# Patient Record
Sex: FEMALE | Employment: FULL TIME | URBAN - METROPOLITAN AREA
[De-identification: names, ages, dates, MRNs, and addresses within clinical notes are randomized per-mention and may not be internally consistent; named-entity substitution may affect disease eponyms.]

---

## 2024-02-23 ENCOUNTER — APPOINTMENT (OUTPATIENT)
Dept: LAB | Facility: CLINIC | Age: 34
End: 2024-02-23

## 2024-02-23 ENCOUNTER — OCCMED (OUTPATIENT)
Dept: URGENT CARE | Facility: CLINIC | Age: 34
End: 2024-02-23

## 2024-02-23 DIAGNOSIS — Z02.1 PRE-EMPLOYMENT EXAMINATION: Primary | ICD-10-CM

## 2024-02-23 DIAGNOSIS — Z02.1 PRE-EMPLOYMENT EXAMINATION: ICD-10-CM

## 2024-02-23 LAB
MEV IGG SER QL IA: ABNORMAL
MUV IGG SER QL IA: NORMAL
RUBV IGG SERPL IA-ACNC: 136.6 IU/ML
VZV IGG SER QL IA: ABNORMAL

## 2024-02-23 PROCEDURE — 86787 VARICELLA-ZOSTER ANTIBODY: CPT

## 2024-02-23 PROCEDURE — 86735 MUMPS ANTIBODY: CPT

## 2024-02-23 PROCEDURE — 86765 RUBEOLA ANTIBODY: CPT

## 2024-02-23 PROCEDURE — 86480 TB TEST CELL IMMUN MEASURE: CPT

## 2024-02-23 PROCEDURE — 86762 RUBELLA ANTIBODY: CPT

## 2024-02-23 PROCEDURE — 36415 COLL VENOUS BLD VENIPUNCTURE: CPT

## 2024-02-24 LAB
GAMMA INTERFERON BACKGROUND BLD IA-ACNC: 0.16 IU/ML
M TB IFN-G BLD-IMP: NEGATIVE
M TB IFN-G CD4+ BCKGRND COR BLD-ACNC: -0.07 IU/ML
M TB IFN-G CD4+ BCKGRND COR BLD-ACNC: -0.09 IU/ML
MITOGEN IGNF BCKGRD COR BLD-ACNC: 9.84 IU/ML

## 2024-04-17 ENCOUNTER — COSMETIC (OUTPATIENT)
Dept: PLASTIC SURGERY | Facility: CLINIC | Age: 34
End: 2024-04-17

## 2024-04-17 DIAGNOSIS — Z41.1 ENCOUNTER FOR COSMETIC SURGERY: Primary | ICD-10-CM

## 2024-04-17 PROCEDURE — BOTOX2 TWO AREAS OR 50 UNITS: Performed by: STUDENT IN AN ORGANIZED HEALTH CARE EDUCATION/TRAINING PROGRAM

## 2024-04-17 PROCEDURE — BOTOX1U PR BOTOX BY THE UNIT: Performed by: STUDENT IN AN ORGANIZED HEALTH CARE EDUCATION/TRAINING PROGRAM

## 2024-04-17 NOTE — PROGRESS NOTES
Botox Consult     First time?: no, had with other providers  Allergies: NKDA  Blood thinners: none   Pregnant: no  Neuromuscular conditions: no    Patient has never had botox, interested in maintenance. Particular areas of concern:  11s and forehead     Will proceed with 20 units of botox     Risks and benefits discussed, patient agreed to proceed.     14 units to glabellum  6 units to forehead     Total 20 units, 10% off employee     Patient tolerated well, f/u in 3 months        Gume Gallagher MD   [facility]astic and Re[facility]   [address] [place] [address], [address]   [place] [address]   Office: [phone]

## 2024-04-30 ENCOUNTER — TELEPHONE (OUTPATIENT)
Age: 34
End: 2024-04-30

## 2024-11-12 NOTE — TELEPHONE ENCOUNTER
Pt called said she is being billed for her last appt, something was entered in wrong and it should be emailed to [email or web address]    Please reach out to the pt when billing is updated   
Pt was called and informed that her account has been fixed.  The 10% discount was not entered in her registration.  Pt understood.  
DISCHARGE

## 2024-11-27 ENCOUNTER — TELEMEDICINE (OUTPATIENT)
Dept: OTHER | Facility: HOSPITAL | Age: 34
End: 2024-11-27
Payer: COMMERCIAL

## 2024-11-27 DIAGNOSIS — B00.1 COLD SORE: Primary | ICD-10-CM

## 2024-11-27 PROCEDURE — 99213 OFFICE O/P EST LOW 20 MIN: CPT | Performed by: PHYSICIAN ASSISTANT

## 2024-11-27 RX ORDER — VALACYCLOVIR HYDROCHLORIDE 1 G/1
TABLET, FILM COATED ORAL
Qty: 4 TABLET | Refills: 1 | Status: SHIPPED | OUTPATIENT
Start: 2024-11-27 | End: 2024-11-27

## 2024-11-27 RX ORDER — VALACYCLOVIR HYDROCHLORIDE 500 MG/1
500 TABLET, FILM COATED ORAL DAILY
Qty: 5 TABLET | Refills: 1 | Status: SHIPPED | OUTPATIENT
Start: 2024-11-27 | End: 2024-12-02

## 2024-11-27 NOTE — PROGRESS NOTES
Virtual Regular Visit  Name: Rosa Maria Negron      : 1990      MRN: [id number]  Encounter Provider: Danielle Lee Seiple, PA-C  Encounter Date: 2024   Encounter department: VIRTUAL CARE       Verification of patient location:  Patient is located at Other in the following state in which I hold an active license [place] :  Assessment & Plan  Cold sore    Orders:    valACYclovir (VALTREX) 1,000 mg tablet; Give 2 tablets by mouth twice a day for 1 day    valACYclovir (VALTREX) 500 mg tablet; Take 1 tablet (500 mg total) by mouth daily for 5 days    Patient with recurrent cold sores over the past month. Start valacyclovir 2000mg PO BID x 1 day, then take 500mg PO QD x 5 more days for suppression.  Continue lyseine supplement once daily. Take on an empty stomach with a full glass of water.  Also recommend daily vitamin D and elderberry supplement.     Encounter provider Danielle Lee Seiple, PA-C    The patient was identified by name and date of birth. Rosa Maria Negron was informed that this is a telemedicine visit and that the visit is being conducted through the Epic Embedded platform. She agrees to proceed..  My office door was closed. No one else was in the room.  She acknowledged consent and understanding of privacy and security of the video platform. The patient has agreed to participate and understands they can discontinue the visit at any time.    Patient is aware this is a billable service.     History was obtained from: History obtained from: patient  History of Present Illness     Rosa Maria presents via virtual visit for evaluation of recurrent cold sores. She has been getting them since she was a child, occurring every few years. However, over the past month, has had recurrent issues. Needs valtrex.   Normal appetite, drinking. Normal urine output and bowel movements.   Denies headache, fever.       Review of Systems   Constitutional:  Negative for activity change, appetite change, fatigue and fever.   HENT:   Negative for congestion, ear pain, rhinorrhea, sinus pressure, sinus pain, sneezing, sore throat and trouble swallowing.    Eyes:  Negative for discharge and redness.   Respiratory:  Negative for cough, shortness of breath and wheezing.    Gastrointestinal:  Negative for abdominal pain, constipation, diarrhea, nausea and vomiting.   Skin:  Negative for rash.        Cold sore       Objective   There were no vitals taken for this visit.    Physical Exam  Constitutional:       General: She is awake.      Appearance: Normal appearance. She is well-developed, well-groomed and normal weight. She is not ill-appearing.   HENT:      Head: Normocephalic.      Right Ear: External ear normal.      Left Ear: External ear normal.      Nose: Nose normal. No congestion or rhinorrhea.      Mouth/Throat:      Lips: Pink. No lesions.      Mouth: Mucous membranes are moist.      Comments: Right corner of mouth with ulceration, shiny with vaseline atop  Eyes:      General: Lids are normal.   Pulmonary:      Effort: Pulmonary effort is normal. No respiratory distress.   Lymphadenopathy:      Head:      Right side of head: No tonsillar adenopathy.      Left side of head: No tonsillar adenopathy.   Skin:     Coloration: Skin is not pale.      Findings: No rash.   Neurological:      Mental Status: She is alert.   Psychiatric:         Attention and Perception: Attention normal.         Speech: Speech normal.         Behavior: Behavior is cooperative.         Visit Time  Total Visit Duration: 7 minutes not including the time spent for establishing the audio/video connection.

## 2025-01-31 ENCOUNTER — TELEMEDICINE (OUTPATIENT)
Dept: OTHER | Facility: HOSPITAL | Age: 35
End: 2025-01-31
Payer: COMMERCIAL

## 2025-01-31 DIAGNOSIS — Z76.0 ENCOUNTER FOR MEDICATION REFILL: Primary | ICD-10-CM

## 2025-01-31 PROBLEM — B00.1 RECURRENT COLD SORES: Status: ACTIVE | Noted: 2025-01-31

## 2025-01-31 PROCEDURE — 99213 OFFICE O/P EST LOW 20 MIN: CPT | Performed by: PHYSICIAN ASSISTANT

## 2025-01-31 RX ORDER — LISINOPRIL 20 MG/1
20 TABLET ORAL DAILY
COMMUNITY
End: 2025-01-31

## 2025-01-31 RX ORDER — TRETINOIN 0.25 MG/G
1 CREAM TOPICAL
COMMUNITY
End: 2025-01-31

## 2025-01-31 RX ORDER — MULTIVIT-MIN/IRON/FOLIC ACID/K 18-600-40
50 CAPSULE ORAL DAILY
COMMUNITY

## 2025-01-31 RX ORDER — TRETINOIN 0.5 MG/G
1 CREAM TOPICAL
COMMUNITY
End: 2025-02-01 | Stop reason: SDUPTHER

## 2025-01-31 RX ORDER — PROMETHAZINE HYDROCHLORIDE 25 MG/1
25 TABLET ORAL EVERY 6 HOURS PRN
COMMUNITY
Start: 2025-01-16 | End: 2025-01-31

## 2025-01-31 NOTE — PROGRESS NOTES
Virtual Regular Visit  Name: Rosa Maria Negron      : 1990      MRN: [id number]  Encounter Provider: Shannon D Severino, PA-C  Encounter Date: 2025   Encounter department: VIRTUAL CARE       Verification of patient location:  Patient is located at Other in the following state in which I [place] an active license [place] :  Assessment & Plan  Encounter for medication refill    Orders:    Ambulatory Referral to Family Practice; Future    Ambulatory Referral to Dermatology; Future  Discussed because this is not an urgent matter, defer to PCP/derm      Encounter provider Shannon D Severino, PA-C    The patient was identified by name and date of birth. Rosa Maria Negron was informed that this is a telemedicine visit and that the visit is being conducted through the Epic Embedded platform. She agrees to proceed..  My office door was closed. No one else was in the room.  She acknowledged consent and understanding of privacy and security of the video platform. The patient has agreed to participate and understands they can discontinue the visit at any time.    Patient is aware this is a billable service.     History was obtained from: History obtained from: patient and  in car  History of Present Illness     Pt requesting refill of the tretinoin. Last had it about 2-3 months ago. Recently moved from [place]. Was getting prescribed my a skin clinic. Does not have a PCP.       Review of Systems   Constitutional:  Negative for fever.   HENT:  Negative for nosebleeds.    Eyes:  Negative for redness.   Respiratory:  Negative for shortness of breath.    Cardiovascular:  Negative for chest pain.   Gastrointestinal:  Negative for blood in stool.   Genitourinary:  Negative for hematuria.   Musculoskeletal:  Negative for gait problem.   Skin:  Negative for rash.   Neurological:  Negative for seizures.   Psychiatric/Behavioral:  Negative for behavioral problems.        Objective   There were no vitals taken for this  visit.    Physical Exam  Constitutional:       General: She is not in acute distress.     Appearance: Normal appearance. She is not toxic-appearing.   HENT:      Head: Normocephalic and atraumatic.      Nose: No rhinorrhea.      Mouth/Throat:      Mouth: Mucous membranes are moist.   Eyes:      Conjunctiva/sclera: Conjunctivae normal.   Pulmonary:      Effort: Pulmonary effort is normal. No respiratory distress.      Breath sounds: No wheezing (no gross audible wheeze through computer).   Musculoskeletal:      Cervical back: Normal range of motion.   Skin:     Findings: No rash (on face or neck).   Neurological:      Mental Status: She is alert.      Cranial Nerves: No dysarthria or facial asymmetry.   Psychiatric:         Mood and Affect: Mood normal.         Behavior: Behavior normal.         Visit Time  Total Visit Duration: 7 minutes not including the time spent for establishing the audio/video connection.

## 2025-02-01 DIAGNOSIS — L70.9 ACNE, UNSPECIFIED ACNE TYPE: Primary | ICD-10-CM

## 2025-02-01 RX ORDER — TRETINOIN 0.5 MG/G
1 CREAM TOPICAL
Qty: 20 G | Refills: 0 | Status: SHIPPED | OUTPATIENT
Start: 2025-02-01 | End: 2025-02-07 | Stop reason: SDUPTHER

## 2025-02-07 ENCOUNTER — OFFICE VISIT (OUTPATIENT)
Age: 35
End: 2025-02-07
Payer: COMMERCIAL

## 2025-02-07 VITALS
HEIGHT: 60 IN | DIASTOLIC BLOOD PRESSURE: 80 MMHG | BODY MASS INDEX: 22.58 KG/M2 | OXYGEN SATURATION: 99 % | SYSTOLIC BLOOD PRESSURE: 110 MMHG | HEART RATE: 104 BPM | WEIGHT: 115 LBS | TEMPERATURE: 97.8 F

## 2025-02-07 DIAGNOSIS — R19.7 DIARRHEA, UNSPECIFIED TYPE: ICD-10-CM

## 2025-02-07 DIAGNOSIS — B00.1 RECURRENT COLD SORES: Primary | ICD-10-CM

## 2025-02-07 DIAGNOSIS — L70.9 ACNE, UNSPECIFIED ACNE TYPE: ICD-10-CM

## 2025-02-07 DIAGNOSIS — Z76.0 ENCOUNTER FOR MEDICATION REFILL: ICD-10-CM

## 2025-02-07 PROBLEM — Z83.79 FAMILY HISTORY OF CELIAC DISEASE: Status: ACTIVE | Noted: 2023-06-15

## 2025-02-07 PROCEDURE — 99204 OFFICE O/P NEW MOD 45 MIN: CPT | Performed by: PHYSICIAN ASSISTANT

## 2025-02-07 RX ORDER — VALACYCLOVIR HYDROCHLORIDE 500 MG/1
500 TABLET, FILM COATED ORAL 2 TIMES DAILY
Qty: 90 TABLET | Refills: 1 | Status: SHIPPED | OUTPATIENT
Start: 2025-02-07 | End: 2025-05-08

## 2025-02-07 RX ORDER — TRETINOIN 0.5 MG/G
1 CREAM TOPICAL
Qty: 20 G | Refills: 2 | Status: SHIPPED | OUTPATIENT
Start: 2025-02-07

## 2025-02-07 NOTE — ASSESSMENT & PLAN NOTE
-I did recommend she hold on any dairy products and greasy foods over the next 24 hours to see if her diarrhea resolves  - If not I did recommend she proceed with stool cultures.  I did recommend she  the specimen cups today at the lab  - I also ordered a CBC and CMP level for her to complete  - If there is no improvement with conservative treatment and her stool cultures are negative I would then recommend she see gastroenterology  Orders:    CBC and differential    Comprehensive metabolic panel    TSH, 3rd generation with Free T4 reflex    Ova and parasite examination; Future    Stool Enteric Bacterial Panel by PCR; Future    Clostridium difficile toxin by PCR with EIA; Future

## 2025-02-07 NOTE — PROGRESS NOTES
Name: Rosa Maria Negron      : 1990      MRN: [id number]  Encounter Provider: Viry Blackwell PA-C  Encounter Date: 2025   Encounter department: [facility] PRIMARY CARE  :  Assessment & Plan  Encounter for medication refill  -This encounter was chosen by the urgent care provider at the time of referring her and is not able to be removed at this time.  Orders:    Ambulatory Referral to Family Practice    Recurrent cold sores  -Since she is having recurrent episodes of the HSV lesions I did recommend she try daily prophylactic Valtrex 500 mg to see if this would help reduce her reoccurrences.  She would like to give this a try.  - I did recommend she follow-up with me in 3 months to include annual physical  Orders:    CBC and differential    Comprehensive metabolic panel    TSH, 3rd generation with Free T4 reflex    valACYclovir (VALTREX) 500 mg tablet; Take 1 tablet (500 mg total) by mouth 2 (two) times a day    Diarrhea, unspecified type  -I did recommend she hold on any dairy products and greasy foods over the next 24 hours to see if her diarrhea resolves  - If not I did recommend she proceed with stool cultures.  I did recommend she  the specimen cups today at the lab  - I also ordered a CBC and CMP level for her to complete  - If there is no improvement with conservative treatment and her stool cultures are negative I would then recommend she see gastroenterology  Orders:    CBC and differential    Comprehensive metabolic panel    TSH, 3rd generation with Free T4 reflex    Ova and parasite examination; Future    Stool Enteric Bacterial Panel by PCR; Future    Clostridium difficile toxin by PCR with EIA; Future    Acne, unspecified acne type  -She is taken the Retin-A for wrinkles prevention.  I did refill this medication for her since she is not able to get into [facility] dermatology until November.  She will follow-up with the specialist as scheduled and  Orders:    tretinoin  (RETIN-A) 0.05 % cream; Apply 1 Application topically daily at bedtime    M*[employer] software was used to dictate this note. It may contain errors with dictating incorrect words/spelling. Please contact provider directly for any questions.          History of Present Illness   Patient presents today to establish care.    She states she does have concerns about recurrent cold sores.  She states she has been getting them since she was a kid.  Her mom had cold sores so she feels as though she may have contracted them from her.  She states over the last 3 months though she has been getting an outbreak at least once a month.  She states that she had several visits with urgent care who placed her on Valtrex as needed for recurrent cold sores.  She would like to consider something more to help alleviate her symptoms from reoccurring.    She also states she has had loose stools for approximately 3 weeks.  She states her last episode was today.  She states she had at least 4 loose stools today.  Yesterday she did not have a bowel movement at all.  When her symptoms first started she states that she did have recurrent episodes multiple times a day for several days but it never completely resolved.  She denies any fever, chills, summoning, blood in her stools.  She states that she has noticed intermittent nausea.  She has been taking Pepto-Bismol as needed.  She denies any antibiotic use or recent travel.  She states 3 months ago her  had C. difficile because of the antibiotics that he was given for his Crohn's disease.-I did recommend she try to hold on any type of dairy products, greasy foods over the next 48 hours  -      Review of Systems   Constitutional:  Negative for chills, fever and unexpected weight change.   Gastrointestinal:  Positive for diarrhea. Negative for abdominal pain, blood in stool, constipation and vomiting.   Skin:  Positive for rash (Recurrent cold sores on her lips.).        She states that she  has been on Retin-A for several years for wrinkles prevention.  She would like a refill of the medication because she is not able to get into [facility] dermatology until November.       Objective   /80 (BP Location: Left arm, Patient Position: Sitting, Cuff Size: Standard)   Pulse 104   Temp 97.8 °F (36.6 °C)   Ht 5' (1.524 m)   Wt 52.2 kg (115 lb)   SpO2 99%   BMI 22.46 kg/m²      Physical Exam  Vitals reviewed.   Constitutional:       General: She is not in acute distress.     Appearance: Normal appearance. She is well-developed. She is not ill-appearing, toxic-appearing or diaphoretic.   HENT:      Head: Normocephalic and atraumatic.      Right Ear: Tympanic membrane normal. There is no impacted cerumen.      Left Ear: Tympanic membrane normal. There is no impacted cerumen.   Neck:      Thyroid: No thyromegaly.   Cardiovascular:      Rate and Rhythm: Normal rate and regular rhythm.      Heart sounds: Normal heart sounds. No murmur heard.  Pulmonary:      Effort: Pulmonary effort is normal. No respiratory distress.      Breath sounds: Normal breath sounds. No wheezing, rhonchi or rales.   Abdominal:      General: Bowel sounds are normal.      Palpations: Abdomen is soft. There is no mass.      Tenderness: There is no abdominal tenderness.   Musculoskeletal:         General: No deformity.      Cervical back: Neck supple.      Right lower leg: No edema.      Left lower leg: No edema.   Lymphadenopathy:      Cervical: No cervical adenopathy.   Skin:     General: Skin is warm.      Comments: Mouth: She does have several vesicular several millimeter lesions on the right side of her upper lip.   Neurological:      General: No focal deficit present.      Mental Status: She is alert.   Psychiatric:         Mood and Affect: Mood normal.         Behavior: Behavior normal.         Thought Content: Thought content normal.         Judgment: Judgment normal.

## 2025-02-07 NOTE — ASSESSMENT & PLAN NOTE
-Since she is having recurrent episodes of the HSV lesions I did recommend she try daily prophylactic Valtrex 500 mg to see if this would help reduce her reoccurrences.  She would like to give this a try.  - I did recommend she follow-up with me in 3 months to include annual physical  Orders:    CBC and differential    Comprehensive metabolic panel    TSH, 3rd generation with Free T4 reflex    valACYclovir (VALTREX) 500 mg tablet; Take 1 tablet (500 mg total) by mouth 2 (two) times a day

## 2025-02-27 ENCOUNTER — COSMETIC (OUTPATIENT)
Age: 35
End: 2025-02-27

## 2025-02-27 DIAGNOSIS — Z41.1 ENCOUNTER FOR COSMETIC PROCEDURE: Primary | ICD-10-CM

## 2025-02-27 PROCEDURE — TOXIN NEUROMODULATOR PER UNIT (BOTOX, DYSPORT, JEUVEAU, XEOMIN, DAXXIFY): Performed by: STUDENT IN AN ORGANIZED HEALTH CARE EDUCATION/TRAINING PROGRAM

## 2025-02-27 PROCEDURE — BOTOX2 TWO AREAS OR 50 UNITS: Performed by: STUDENT IN AN ORGANIZED HEALTH CARE EDUCATION/TRAINING PROGRAM

## 2025-02-27 NOTE — PROGRESS NOTES
Botox Consult     First time?: no, had with other providers  Allergies: NKDA  Blood thinners: none   Pregnant: no  Neuromuscular conditions: no     Patient has had botox with me, interested in maintenance. Particular areas of concern:  11s and forehead     Will proceed with 20 units of botox     Risks and benefits discussed, patient agreed to proceed.     14 units to glabellum  6 units to forehead     Total 20 units, 10% off employee     Patient tolerated well, f/u in 3 months        Gume Gallagher MD   [facility] Plastic and Reconstructive Surgery   [address] [place] [address], [address]   [place] [address]   Office: [phone]

## 2025-05-15 ENCOUNTER — OFFICE VISIT (OUTPATIENT)
Age: 35
End: 2025-05-15
Payer: COMMERCIAL

## 2025-05-15 VITALS
WEIGHT: 116 LBS | RESPIRATION RATE: 18 BRPM | BODY MASS INDEX: 22.78 KG/M2 | HEIGHT: 60 IN | TEMPERATURE: 97.4 F | SYSTOLIC BLOOD PRESSURE: 110 MMHG | OXYGEN SATURATION: 100 % | DIASTOLIC BLOOD PRESSURE: 78 MMHG | HEART RATE: 88 BPM

## 2025-05-15 DIAGNOSIS — Z23 ENCOUNTER FOR IMMUNIZATION: ICD-10-CM

## 2025-05-15 DIAGNOSIS — Z00.00 WELL ADULT EXAM: Primary | ICD-10-CM

## 2025-05-15 DIAGNOSIS — B00.1 RECURRENT COLD SORES: ICD-10-CM

## 2025-05-15 PROCEDURE — 99395 PREV VISIT EST AGE 18-39: CPT | Performed by: PHYSICIAN ASSISTANT

## 2025-05-15 PROCEDURE — 90471 IMMUNIZATION ADMIN: CPT

## 2025-05-15 PROCEDURE — 90651 9VHPV VACCINE 2/3 DOSE IM: CPT

## 2025-05-15 RX ORDER — VALACYCLOVIR HYDROCHLORIDE 500 MG/1
500 TABLET, FILM COATED ORAL DAILY
Qty: 90 TABLET | Refills: 1 | Status: SHIPPED | OUTPATIENT
Start: 2025-05-15 | End: 2025-11-11

## 2025-05-15 NOTE — PROGRESS NOTES
Name: Rosa Maria Negron      : 1990      MRN: [id number]  Encounter Provider: Viry Blackwell PA-C  Encounter Date: 5/15/2025   Encounter department: [facility] [address] PRIMARY CARE  :  Assessment & Plan  Well adult exam  For Pap smear  - She is going to start the HPV vaccine.  I did advise her that she needs a second 1 in 2 months and the third 1 in 6 months  -She will check with employee health about her last Tdap.  She states that she started here a year ago but she does not remember if she was given 1  - Routine physical in 1 year, follow-up in 6 months       Recurrent cold sores  -She states has been doing well on the Valtrex 500 mg once daily which she will continue  Orders:    valACYclovir (VALTREX) 500 mg tablet; Take 1 tablet (500 mg total) by mouth daily    Encounter for immunization    Orders:    HPV VACCINE 9 VALENT IM    HPV VACCINE 9 VALENT IM; Future    M*[employer] software was used to dictate this note. It may contain errors with dictating incorrect words/spelling. Please contact provider directly for any questions.          History of Present Illness   Patient presents today for annual physical.  She does not have any concerns at this time.  She states that the Valtrex 500 mg once a day has helped with any recurrent cold sores.    - She does see the dentist at least twice a year  - Denies any vision problems  -She does eat a healthy diet  - She does exercise at least 4 times a week for 1-1/2 hours  - Denies any smoking, alcohol, drug use or vaping  - She states that she is due for a Pap smear.  She will schedule with [facility] gynecology  - She is unsure if she is ever gotten the HPV vaccine but she would like to start it today  - Last Tdap unknown.  She will check with employee health at [facility] to see if it was given at the time of hire      Review of Systems    Objective   /78   Pulse 88   Temp (!) 97.4 °F (36.3 °C)   Resp 18   Ht 5' (1.524 m)   Wt 52.6 kg (116 lb)   LMP  05/02/2025 (Approximate)   SpO2 100%   BMI 22.65 kg/m²      Physical Exam  Vitals reviewed.   Constitutional:       General: She is not in acute distress.     Appearance: Normal appearance. She is well-developed. She is not ill-appearing, toxic-appearing or diaphoretic.   HENT:      Head: Normocephalic and atraumatic.   Neck:      Thyroid: No thyromegaly.     Cardiovascular:      Rate and Rhythm: Normal rate and regular rhythm.      Heart sounds: Normal heart sounds. No murmur heard.  Pulmonary:      Effort: Pulmonary effort is normal. No respiratory distress.      Breath sounds: Normal breath sounds. No wheezing, rhonchi or rales.   Abdominal:      General: Bowel sounds are normal.      Palpations: Abdomen is soft. There is no mass.      Tenderness: There is no abdominal tenderness.     Musculoskeletal:         General: No deformity.      Cervical back: Neck supple.      Right lower leg: No edema.      Left lower leg: No edema.   Lymphadenopathy:      Cervical: No cervical adenopathy.     Skin:     General: Skin is warm.     Neurological:      General: No focal deficit present.      Mental Status: She is alert.     Psychiatric:         Mood and Affect: Mood normal.         Behavior: Behavior normal.         Thought Content: Thought content normal.         Judgment: Judgment normal.

## 2025-05-15 NOTE — ASSESSMENT & PLAN NOTE
-She states has been doing well on the Valtrex 500 mg once daily which she will continue  Orders:    valACYclovir (VALTREX) 500 mg tablet; Take 1 tablet (500 mg total) by mouth daily

## 2025-07-03 ENCOUNTER — OFFICE VISIT (OUTPATIENT)
Age: 35
End: 2025-07-03
Payer: COMMERCIAL

## 2025-07-03 DIAGNOSIS — M54.50 ACUTE MIDLINE LOW BACK PAIN WITHOUT SCIATICA: Primary | ICD-10-CM

## 2025-07-03 PROCEDURE — 97140 MANUAL THERAPY 1/> REGIONS: CPT | Performed by: PHYSICAL THERAPIST

## 2025-07-03 PROCEDURE — 97112 NEUROMUSCULAR REEDUCATION: CPT | Performed by: PHYSICAL THERAPIST

## 2025-07-03 PROCEDURE — 97161 PT EVAL LOW COMPLEX 20 MIN: CPT | Performed by: PHYSICAL THERAPIST

## 2025-07-03 NOTE — PROGRESS NOTES
PT Evaluation     Today's date: 7/3/2025  Patient name: Rosa Maria Negron  : 1990  MRN: [id number]  Referring provider: Viry Blackwell PA-C  Dx:   Encounter Diagnosis     ICD-10-CM    1. Acute midline low back pain without sciatica  M54.50                      Assessment/Plan    Subjective    Objective           Precautions:       Manuals                                                                 Neuro Re-Ed                                                                                                        Ther Ex                                                                                                                     Ther Activity                                       Gait Training                                       Modalities

## 2025-07-03 NOTE — HOME EXERCISE EDUCATION
Program_ID:[id number]   Access Code: [email or web address]  URL: [email or web address]  Date: 07-  Prepared By: Homero Nuñez    Program Notes      Exercises      - Prone Alternating Arm and Leg Lifts - 1 x daily - 7 x weekly - 3 sets - 10 reps - 5 hold      - Standing Anti-Rotation Press with Anchored Resistance - 1 x daily - 7 x weekly - 3 sets - 10 reps

## 2025-07-10 ENCOUNTER — OFFICE VISIT (OUTPATIENT)
Age: 35
End: 2025-07-10
Payer: COMMERCIAL

## 2025-07-10 DIAGNOSIS — M54.50 ACUTE MIDLINE LOW BACK PAIN WITHOUT SCIATICA: Primary | ICD-10-CM

## 2025-07-10 PROCEDURE — 97140 MANUAL THERAPY 1/> REGIONS: CPT | Performed by: PHYSICAL THERAPIST

## 2025-07-10 PROCEDURE — 97110 THERAPEUTIC EXERCISES: CPT | Performed by: PHYSICAL THERAPIST

## 2025-07-13 NOTE — PROGRESS NOTES
PT Evaluation     Today's date: 2025  Patient name: Rosa Maria Negron  : 1990  MRN: [id number]  Referring provider: Viry Blackwell PA-C  Dx:   Encounter Diagnosis     ICD-10-CM    1. Acute midline low back pain without sciatica  M54.50                      Assessment/Plan    Subjective    Objective           Precautions:       Manuals                                                                 Neuro Re-Ed                                                                                                        Ther Ex                                                                                                                     Ther Activity                                       Gait Training                                       Modalities

## 2025-07-17 ENCOUNTER — APPOINTMENT (OUTPATIENT)
Age: 35
End: 2025-07-17
Payer: COMMERCIAL

## 2025-07-18 ENCOUNTER — CLINICAL SUPPORT (OUTPATIENT)
Age: 35
End: 2025-07-18
Payer: COMMERCIAL

## 2025-07-18 DIAGNOSIS — Z23 ENCOUNTER FOR IMMUNIZATION: Primary | ICD-10-CM

## 2025-07-18 PROCEDURE — 90651 9VHPV VACCINE 2/3 DOSE IM: CPT

## 2025-07-18 PROCEDURE — PBNCHG PB NO CHARGE PLACEHOLDER

## 2025-07-18 PROCEDURE — 90471 IMMUNIZATION ADMIN: CPT

## 2025-07-24 ENCOUNTER — APPOINTMENT (OUTPATIENT)
Age: 35
End: 2025-07-24
Payer: COMMERCIAL

## 2025-07-31 ENCOUNTER — APPOINTMENT (OUTPATIENT)
Age: 35
End: 2025-07-31
Payer: COMMERCIAL

## 2025-08-08 ENCOUNTER — APPOINTMENT (OUTPATIENT)
Age: 35
End: 2025-08-08